# Patient Record
Sex: FEMALE | Race: WHITE | NOT HISPANIC OR LATINO | Employment: FULL TIME | URBAN - METROPOLITAN AREA
[De-identification: names, ages, dates, MRNs, and addresses within clinical notes are randomized per-mention and may not be internally consistent; named-entity substitution may affect disease eponyms.]

---

## 2017-11-29 ENCOUNTER — GENERIC CONVERSION - ENCOUNTER (OUTPATIENT)
Dept: OTHER | Facility: OTHER | Age: 37
End: 2017-11-29

## 2018-01-10 NOTE — MISCELLANEOUS
Message   Recorded as Task   Date: 04/25/2016 08:13 AM, Created By: Nathaniel Malone   Task Name: Med Renewal Request   Assigned To: Katie Calixto   Regarding Patient: Nate Sanchez, Status: Active   CommentHoneyronnie Shay - 25 Apr 2016 8:13 AM     TASK CREATED    sent rx to pharm, yrly changed to july with ktm        Active Problems    1  Contraceptives (V25 02)   2  Encounter for routine gynecological examination (V72 31) (Z01 419)   3  Screening for human papillomavirus (HPV) (V73 81) (Z11 51)    Current Meds   1  Tri-Sprintec 0 18/0 215/0 25 MG-35 MCG Oral Tablet; take 1 tablet by mouth once daily; Therapy: [id number] to (Evaluate:21Mar2016)  Requested for: 20Apr2015; Last   Rx:20Apr2015 Ordered    Allergies    1   No Known Drug Allergies    Plan  Contraceptives    · Tri-Sprintec 0 18/0 215/0 25 MG-35 MCG Oral Tablet; take 1 tablet by mouth  once daily    Signatures   Electronically signed by : Rodo Crowe, ; Apr 25 2016  8:13AM EST                       (Author)

## 2018-01-22 VITALS
WEIGHT: 140 LBS | SYSTOLIC BLOOD PRESSURE: 134 MMHG | BODY MASS INDEX: 24.8 KG/M2 | DIASTOLIC BLOOD PRESSURE: 80 MMHG | HEIGHT: 63 IN

## 2018-11-29 ENCOUNTER — TELEPHONE (OUTPATIENT)
Dept: OBGYN CLINIC | Facility: MEDICAL CENTER | Age: 38
End: 2018-11-29

## 2018-11-29 DIAGNOSIS — IMO0001 BIRTH CONTROL: Primary | ICD-10-CM

## 2018-11-30 RX ORDER — NORGESTIMATE AND ETHINYL ESTRADIOL 7DAYSX3 28
1 KIT ORAL DAILY
Qty: 90 TABLET | Refills: 1 | Status: SHIPPED | OUTPATIENT
Start: 2018-11-30

## 2019-04-15 ENCOUNTER — TELEPHONE (OUTPATIENT)
Dept: OBGYN CLINIC | Facility: CLINIC | Age: 39
End: 2019-04-15
